# Patient Record
Sex: FEMALE | Race: WHITE
[De-identification: names, ages, dates, MRNs, and addresses within clinical notes are randomized per-mention and may not be internally consistent; named-entity substitution may affect disease eponyms.]

---

## 2018-11-08 ENCOUNTER — HOSPITAL ENCOUNTER (EMERGENCY)
Dept: HOSPITAL 31 - C.ER | Age: 23
Discharge: HOME | End: 2018-11-08
Payer: MEDICAID

## 2018-11-08 VITALS
SYSTOLIC BLOOD PRESSURE: 118 MMHG | HEART RATE: 102 BPM | DIASTOLIC BLOOD PRESSURE: 74 MMHG | RESPIRATION RATE: 18 BRPM | TEMPERATURE: 99 F

## 2018-11-08 VITALS — OXYGEN SATURATION: 100 %

## 2018-11-08 VITALS — BODY MASS INDEX: 19.7 KG/M2

## 2018-11-08 DIAGNOSIS — J03.90: Primary | ICD-10-CM

## 2018-11-08 NOTE — C.PDOC
History Of Present Illness


22 y/o female presents to the ER complaining of subjective fever, sore throat, 

and cough with clear phlegm for the past 3 days. Patient denies having headache,

nasal congestion, nausea, and vomiting. 


Time Seen by Provider: 11/08/18 11:22


Chief Complaint (Nursing): Cough, Cold, Congestion


History Per: Patient


History/Exam Limitations: no limitations


Onset/Duration Of Symptoms: Days


Current Symptoms Are (Timing): Still Present


Severity: Moderate





Past Medical History


Reviewed: Historical Data, Nursing Documentation, Vital Signs


Vital Signs: 





                                Last Vital Signs











Temp  99.6 F   11/08/18 11:02


 


Pulse  119 H  11/08/18 11:02


 


Resp  17   11/08/18 11:02


 


BP  127/83   11/08/18 11:02


 


Pulse Ox  100   11/08/18 11:02














- Medical History


PMH: No Chronic Diseases


Surgical History: No Surg Hx


Family History: States: No Known Family Hx





- Social History


Hx Tobacco Use: No


Hx Alcohol Use: No


Hx Substance Use: No





- Immunization History


Hx Tetanus Toxoid Vaccination: Yes


Hx Influenza Vaccination: Yes (2017)


Hx Pneumococcal Vaccination: Yes





Review Of Systems


Except As Marked, All Systems Reviewed And Found Negative.


Constitutional: Positive for: Fever (subjective fever).  Negative for: Chills


ENT: Positive for: Throat Pain.  Negative for: Nose Congestion


Respiratory: Positive for: Cough


Gastrointestinal: Negative for: Nausea, Vomiting


Neurological: Negative for: Headache





Physical Exam





- Physical Exam


Appears: Non-toxic, No Acute Distress


Skin: Normal Color, Warm, Dry


Head: Atraumatic, Normacephalic


Eye(s): bilateral: Normal Inspection


Ear(s): Bilateral: Normal


Nose: Normal


Oral Mucosa: Moist


Throat: Erythema (bilateral pharyngeal erythema), Exudate (tonsillar exudates)


Neck: Supple


Chest: Symmetrical


Cardiovascular: Rhythm Regular


Respiratory: Normal Breath Sounds, No Rales, No Rhonchi, No Wheezing


Neurological/Psych: Oriented x3, Normal Speech





ED Course And Treatment


O2 Sat by Pulse Oximetry: 100 (RA)


Pulse Ox Interpretation: Normal


Progress Note: Patient treated with Amoxicillin PO and Motrin PO. Patient has 

been discharged and instructed to follow up with PMD in 1-2 days.





Disposition





- Disposition


Disposition: HOME/ ROUTINE


Disposition Time: 11:30


Condition: STABLE


Additional Instructions: 


Follow up with your PMD within 1-2 days. Return to ED if feel worse.


Prescriptions: 


Amoxicillin [Amoxil 500 mg Cap] 500 mg PO Q8 #30 cap


Ibuprofen [Motrin Tab] 400 mg PO Q8 #30 tab


Benzonatate [Tessalon Perles] 2 tab PO TID #60 sgl


Instructions:  Sore Throat, Adult (DC)


Forms:  [facility] (English), School Excuse, Work Excuse





- Clinical Impression


Clinical Impression: 


 Tonsillitis








- PA / NP / Resident Statement


MD/DO has reviewed & agrees with the documentation as recorded.





- Scribe Statement


The provider has reviewed the documentation as recorded by the Rubinaibe


Russell Jay





Provider Attestation





All medical record entries made by the Eleanor were at my direction and 

personally dictated by me. I have reviewed the chart and agree that the record 

accurately reflects my personal performance of the history, physical exam, medi

regine decision making, and the department course for this patient. I have also 

personally directed, reviewed, and agree with the discharge instructions and 

disposition.